# Patient Record
Sex: MALE | Race: WHITE | NOT HISPANIC OR LATINO | Employment: FULL TIME | URBAN - METROPOLITAN AREA
[De-identification: names, ages, dates, MRNs, and addresses within clinical notes are randomized per-mention and may not be internally consistent; named-entity substitution may affect disease eponyms.]

---

## 2018-01-29 ENCOUNTER — TELEMEDICINE2 (OUTPATIENT)
Dept: MEDICAL GROUP | Age: 38
End: 2018-01-29
Payer: COMMERCIAL

## 2018-01-29 ENCOUNTER — TELEMEDICINE ORIGINATING SITE VISIT (OUTPATIENT)
Dept: MEDICAL GROUP | Facility: CLINIC | Age: 38
End: 2018-01-29
Payer: COMMERCIAL

## 2018-01-29 VITALS
WEIGHT: 250.1 LBS | BODY MASS INDEX: 33.87 KG/M2 | HEART RATE: 94 BPM | TEMPERATURE: 99.2 F | HEIGHT: 72 IN | DIASTOLIC BLOOD PRESSURE: 95 MMHG | RESPIRATION RATE: 16 BRPM | SYSTOLIC BLOOD PRESSURE: 148 MMHG | OXYGEN SATURATION: 94 %

## 2018-01-29 DIAGNOSIS — M1A.09X0 CHRONIC GOUT OF MULTIPLE SITES, UNSPECIFIED CAUSE: ICD-10-CM

## 2018-01-29 DIAGNOSIS — E66.09 CLASS 1 OBESITY DUE TO EXCESS CALORIES WITHOUT SERIOUS COMORBIDITY WITH BODY MASS INDEX (BMI) OF 30.0 TO 30.9 IN ADULT: ICD-10-CM

## 2018-01-29 DIAGNOSIS — J40 BRONCHITIS: ICD-10-CM

## 2018-01-29 PROCEDURE — 99203 OFFICE O/P NEW LOW 30 MIN: CPT | Performed by: INTERNAL MEDICINE

## 2018-01-29 RX ORDER — GUAIFENESIN 200 MG/1
200 TABLET ORAL 3 TIMES DAILY PRN
Qty: 30 TAB | Refills: 1 | Status: SHIPPED | OUTPATIENT
Start: 2018-01-29

## 2018-01-29 RX ORDER — INDOMETHACIN 25 MG/1
25 CAPSULE ORAL 3 TIMES DAILY
COMMUNITY

## 2018-01-29 RX ORDER — AZITHROMYCIN 250 MG/1
TABLET, FILM COATED ORAL
Qty: 6 TAB | Refills: 0 | Status: SHIPPED | OUTPATIENT
Start: 2018-01-29

## 2018-01-29 RX ORDER — COLCHICINE 0.6 MG/1
0.6 TABLET ORAL DAILY
COMMUNITY

## 2018-01-29 ASSESSMENT — ENCOUNTER SYMPTOMS
NEUROLOGICAL NEGATIVE: 1
CHILLS: 0
COUGH: 1
MUSCULOSKELETAL NEGATIVE: 1
EYES NEGATIVE: 1
CARDIOVASCULAR NEGATIVE: 1
SPUTUM PRODUCTION: 1
SINUS PAIN: 0
GASTROINTESTINAL NEGATIVE: 1
WHEEZING: 0
FEVER: 1
SHORTNESS OF BREATH: 1
SORE THROAT: 1

## 2018-01-29 ASSESSMENT — PATIENT HEALTH QUESTIONNAIRE - PHQ9: CLINICAL INTERPRETATION OF PHQ2 SCORE: 0

## 2018-01-29 NOTE — PROGRESS NOTES
Subjective:      Arben Hicks is a 37 y.o. 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  Patient lives in [place]    1. Cough/bronchitis  Patient has a 3-4 day history of intermittent fevers, sore throat, productive cough, mild shortness of breath related to coughing. Minimal sinus drainage, fatigue. Patient has been treating with over-the-counter NyQuil and Mucinex with Advil. Sick contacts at work. Patient did not get his flu vaccine this year.    2. Gout  Stable with colchicine and Indocin when necessary    3. Obesity    Review of Systems   Constitutional: Positive for fever and malaise/fatigue. Negative for chills.   HENT: Positive for congestion and sore throat. Negative for sinus pain.    Eyes: Negative.    Respiratory: Positive for cough, sputum production and shortness of breath. Negative for wheezing.    Cardiovascular: Negative.    Gastrointestinal: Negative.    Genitourinary: Negative.    Musculoskeletal: Negative.    Skin: Negative.    Neurological: Negative.           Objective:     /95   Pulse 94   Temp 37.3 °C (99.2 °F)   Resp 16   Ht 1.829 m (6')   Wt 113.4 kg (250 lb 1.6 oz)   SpO2 94%   BMI 33.92 kg/m²      Physical Exam   Constitutional: He is oriented to person, place, and time. He appears well-developed and well-nourished. No distress.   HENT:   Head: Normocephalic and atraumatic.   Nose: Nose normal.   Mouth/Throat: No oropharyngeal exudate.   Oropharynx with erythema no exudate   Eyes: Conjunctivae and EOM are normal. Pupils are equal, round, and reactive to light. No scleral icterus.   Neck: Normal range of motion. Neck supple. No thyromegaly present.   No submandibular lymphadenopathy   Cardiovascular: Normal rate, regular rhythm, S1 normal, S2 normal, normal heart sounds and intact distal pulses.  Exam reveals no gallop.    Pulmonary/Chest: Effort normal. No  respiratory distress. He has no wheezes. He has no rhonchi.   Mild upper airway rales   Abdominal: Soft. Bowel sounds are normal. He exhibits no distension. There is no hepatosplenomegaly, splenomegaly or hepatomegaly. There is no tenderness. There is no rebound and no guarding.   Musculoskeletal: Normal range of motion. He exhibits no edema.   Lymphadenopathy:     He has no cervical adenopathy.   Neurological: He is alert and oriented to person, place, and time. He has normal strength and normal reflexes. Gait normal.   Skin: Skin is warm and dry.   Psychiatric: He has a normal mood and affect.   Nursing note and vitals reviewed.              Assessment/Plan:     1. Chronic gout of multiple sites, unspecified cause  Stable  Follow-up with PCP    2. Bronchitis  RTC if symptoms do not resolve  Rest, push fluids, vitamin C    - guaifenesin 200 MG tablet; Take 1 Tab by mouth 3 times a day as needed.  Dispense: 30 Tab; Refill: 1  - azithromycin (ZITHROMAX) 250 MG Tab; Take 2 tablets daily one, then one tablet daily  Dispense: 6 Tab; Refill: 0    3. Class 1 obesity due to excess calories without serious comorbidity with body mass index (BMI) of 30.0 to 30.9 in adult  Follow-up with PCP

## 2020-07-05 ENCOUNTER — HOSPITAL ENCOUNTER (EMERGENCY)
Facility: HOSPITAL | Age: 40
Discharge: HOME | End: 2020-07-05
Attending: EMERGENCY MEDICINE
Payer: COMMERCIAL

## 2020-07-05 ENCOUNTER — APPOINTMENT (OUTPATIENT)
Dept: CT IMAGING | Facility: HOSPITAL | Age: 40
End: 2020-07-05
Payer: COMMERCIAL

## 2020-07-05 VITALS
HEART RATE: 100 BPM | HEIGHT: 72 IN | BODY MASS INDEX: 35.24 KG/M2 | TEMPERATURE: 100 F | OXYGEN SATURATION: 91 % | DIASTOLIC BLOOD PRESSURE: 76 MMHG | RESPIRATION RATE: 16 BRPM | WEIGHT: 260.14 LBS | SYSTOLIC BLOOD PRESSURE: 140 MMHG

## 2020-07-05 DIAGNOSIS — R11.0 NAUSEA: ICD-10-CM

## 2020-07-05 DIAGNOSIS — R19.7 DIARRHEA: ICD-10-CM

## 2020-07-05 DIAGNOSIS — K57.92 DIVERTICULITIS: ICD-10-CM

## 2020-07-05 DIAGNOSIS — R10.30 LOWER ABDOMINAL PAIN: Primary | ICD-10-CM

## 2020-07-05 LAB
ALBUMIN SERPL-MCNC: 4.3 G/DL (ref 3.5–5.3)
ALP SERPL-CCNC: 43 U/L (ref 45–115)
ALT SERPL-CCNC: 43 U/L (ref 7–52)
ANION GAP SERPL CALC-SCNC: 9 MMOL/L (ref 3–11)
AST SERPL-CCNC: 22 U/L
BACTERIA #/AREA URNS AUTO: NORMAL /HPF
BASOPHILS # BLD AUTO: 0.1 10*3/UL
BASOPHILS NFR BLD AUTO: 1 % (ref 0–2)
BILIRUB SERPL-MCNC: 1.42 MG/DL (ref 0.2–1.4)
BILIRUB UR QL STRIP.AUTO: NEGATIVE
BUN SERPL-MCNC: 14 MG/DL (ref 7–25)
CALCIUM ALBUM COR SERPL-MCNC: 9.2 MG/DL (ref 8.6–10.3)
CALCIUM SERPL-MCNC: 9.4 MG/DL (ref 8.6–10.3)
CHLORIDE SERPL-SCNC: 103 MMOL/L (ref 98–107)
CLARITY UR: CLEAR
CO2 SERPL-SCNC: 25 MMOL/L (ref 21–32)
COLOR UR: YELLOW
CREAT SERPL-MCNC: 1.05 MG/DL (ref 0.7–1.3)
EOSINOPHIL # BLD AUTO: 0.1 10*3/UL
EOSINOPHIL NFR BLD AUTO: 1 % (ref 0–3)
ERYTHROCYTE [DISTWIDTH] IN BLOOD BY AUTOMATED COUNT: 12.7 % (ref 11.5–15)
GFR SERPL CREATININE-BSD FRML MDRD: 89 ML/MIN/1.73M*2
GLUCOSE SERPL-MCNC: 94 MG/DL (ref 70–105)
GLUCOSE UR STRIP.AUTO-MCNC: NEGATIVE MG/DL
HCT VFR BLD AUTO: 42.9 % (ref 38–50)
HGB BLD-MCNC: 14.9 G/DL (ref 13.2–17.2)
HGB UR QL STRIP.AUTO: NEGATIVE
KETONES UR STRIP.AUTO-MCNC: NEGATIVE MG/DL
LEUKOCYTE ESTERASE UR QL STRIP: NEGATIVE
LIPASE SERPL-CCNC: 14 U/L (ref 11–82)
LYMPHOCYTES # BLD AUTO: 1 10*3/UL
LYMPHOCYTES NFR BLD AUTO: 8 % (ref 15–47)
MCH RBC QN AUTO: 29.2 PG (ref 29–34)
MCHC RBC AUTO-ENTMCNC: 34.6 G/DL (ref 32–36)
MCV RBC AUTO: 84.4 FL (ref 82–97)
MONOCYTES # BLD AUTO: 1.2 10*3/UL
MONOCYTES NFR BLD AUTO: 10 % (ref 5–13)
NEUTROPHILS # BLD AUTO: 9.7 10*3/UL
NEUTROPHILS NFR BLD AUTO: 81 % (ref 46–70)
NITRITE UR QL STRIP.AUTO: NEGATIVE
PH UR STRIP.AUTO: 7 PH
PLATELET # BLD AUTO: 227 10*3/UL (ref 130–350)
PMV BLD AUTO: 7.3 FL (ref 6.9–10.8)
POTASSIUM SERPL-SCNC: 3.8 MMOL/L (ref 3.5–5.1)
PROT SERPL-MCNC: 7.4 G/DL (ref 6–8.3)
PROT UR STRIP.AUTO-MCNC: NEGATIVE MG/DL
RBC # BLD AUTO: 5.09 10*6/ΜL (ref 4.1–5.8)
RBC #/AREA URNS AUTO: NORMAL /HPF
SODIUM SERPL-SCNC: 137 MMOL/L (ref 135–145)
SP GR UR STRIP.AUTO: 1.02 (ref 1–1.03)
SQUAMOUS #/AREA URNS AUTO: NEGATIVE /HPF
UROBILINOGEN UR STRIP.AUTO-MCNC: <2 E.U./DL
WBC # BLD AUTO: 12 10*3/UL (ref 3.7–9.6)
WBC #/AREA URNS AUTO: NEGATIVE /HPF

## 2020-07-05 PROCEDURE — 85025 COMPLETE CBC W/AUTO DIFF WBC: CPT | Performed by: NURSE PRACTITIONER

## 2020-07-05 PROCEDURE — 96374 THER/PROPH/DIAG INJ IV PUSH: CPT

## 2020-07-05 PROCEDURE — 2580000300 HC RX 258: Performed by: NURSE PRACTITIONER

## 2020-07-05 PROCEDURE — 36415 COLL VENOUS BLD VENIPUNCTURE: CPT | Performed by: NURSE PRACTITIONER

## 2020-07-05 PROCEDURE — 96361 HYDRATE IV INFUSION ADD-ON: CPT

## 2020-07-05 PROCEDURE — 80053 COMPREHEN METABOLIC PANEL: CPT | Performed by: NURSE PRACTITIONER

## 2020-07-05 PROCEDURE — 2550000100 HC RX 255: Performed by: NURSE PRACTITIONER

## 2020-07-05 PROCEDURE — 6360000200 HC RX 636 W HCPCS (ALT 250 FOR IP): Performed by: NURSE PRACTITIONER

## 2020-07-05 PROCEDURE — 83690 ASSAY OF LIPASE: CPT | Performed by: NURSE PRACTITIONER

## 2020-07-05 PROCEDURE — 96376 TX/PRO/DX INJ SAME DRUG ADON: CPT

## 2020-07-05 PROCEDURE — 36415 COLL VENOUS BLD VENIPUNCTURE: CPT | Performed by: EMERGENCY MEDICINE

## 2020-07-05 PROCEDURE — 74177 CT ABD & PELVIS W/CONTRAST: CPT | Mod: MG

## 2020-07-05 PROCEDURE — 81001 URINALYSIS AUTO W/SCOPE: CPT | Performed by: NURSE PRACTITIONER

## 2020-07-05 PROCEDURE — 96375 TX/PRO/DX INJ NEW DRUG ADDON: CPT

## 2020-07-05 PROCEDURE — 99284 EMERGENCY DEPT VISIT MOD MDM: CPT | Performed by: EMERGENCY MEDICINE

## 2020-07-05 RX ORDER — SODIUM CHLORIDE 9 MG/ML
1000 INJECTION, SOLUTION INTRAVENOUS ONCE
Status: COMPLETED | OUTPATIENT
Start: 2020-07-05 | End: 2020-07-05

## 2020-07-05 RX ORDER — ONDANSETRON HYDROCHLORIDE 2 MG/ML
4 INJECTION, SOLUTION INTRAVENOUS ONCE
Status: COMPLETED | OUTPATIENT
Start: 2020-07-05 | End: 2020-07-05

## 2020-07-05 RX ORDER — FENTANYL CITRATE/PF 50 MCG/ML
50 PLASTIC BAG, INJECTION (ML) INTRAVENOUS
Status: DISCONTINUED | OUTPATIENT
Start: 2020-07-05 | End: 2020-07-05 | Stop reason: HOSPADM

## 2020-07-05 RX ORDER — CIPROFLOXACIN HCL 500 MG
1 TABLET ORAL ONCE
Status: COMPLETED | OUTPATIENT
Start: 2020-07-05 | End: 2020-07-05

## 2020-07-05 RX ORDER — IOPAMIDOL 755 MG/ML
140 INJECTION, SOLUTION INTRAVASCULAR ONCE
Status: COMPLETED | OUTPATIENT
Start: 2020-07-05 | End: 2020-07-05

## 2020-07-05 RX ORDER — METRONIDAZOLE 500 MG/1
1 TABLET ORAL ONCE
Status: COMPLETED | OUTPATIENT
Start: 2020-07-05 | End: 2020-07-05

## 2020-07-05 RX ADMIN — IOPAMIDOL 140 ML: 755 INJECTION, SOLUTION INTRAVENOUS at 19:25

## 2020-07-05 RX ADMIN — ONDANSETRON 4 MG: 2 INJECTION INTRAMUSCULAR; INTRAVENOUS at 18:45

## 2020-07-05 RX ADMIN — SODIUM CHLORIDE 1000 ML: 9 INJECTION, SOLUTION INTRAVENOUS at 18:44

## 2020-07-05 RX ADMIN — METRONIDAZOLE 1 PACKAGE: 500 TABLET ORAL at 20:49

## 2020-07-05 RX ADMIN — FENTANYL CITRATE 50 MCG: 50 INJECTION, SOLUTION INTRAMUSCULAR; INTRAVENOUS at 19:56

## 2020-07-05 RX ADMIN — FENTANYL CITRATE 50 MCG: 50 INJECTION, SOLUTION INTRAMUSCULAR; INTRAVENOUS at 18:44

## 2020-07-05 RX ADMIN — Medication 1 PACKAGE: at 20:30

## 2020-07-05 ASSESSMENT — ENCOUNTER SYMPTOMS
EYE PAIN: 0
DIFFICULTY URINATING: 0
ACTIVITY CHANGE: 1
BLOOD IN STOOL: 1
ARTHRALGIAS: 0
FATIGUE: 1
NAUSEA: 1
FLANK PAIN: 0
CHILLS: 1
LIGHT-HEADEDNESS: 0
VOMITING: 0
PALPITATIONS: 0
CHEST TIGHTNESS: 0
SINUS PAIN: 0
FEVER: 1
ABDOMINAL PAIN: 1
DYSURIA: 0
HEADACHES: 0
SHORTNESS OF BREATH: 0
COUGH: 0
APPETITE CHANGE: 1
DIARRHEA: 1
EYE DISCHARGE: 0
SINUS PRESSURE: 0
DIZZINESS: 0
RECTAL PAIN: 0

## 2020-07-06 NOTE — DISCHARGE INSTRUCTIONS
You have been evaluated through the emergency department with lower abdominal pain.  CAT scan does show concerning for diverticulitis.  Recommend home to rest.  Take antibiotics as directed.  [place] diet.  Avoid seeds (nuts, tomatoes, strawberries).  Follow-up with gastrointestinal specialty in the next 2 to 4 weeks.  May need to consider colonoscopy completion.  Follow-up with your primary care provider as needed.  Return to the emergency department if worse

## 2020-07-06 NOTE — ED ATTESTATION NOTE
I performed a history and physical examination of Daniel Max and discussed his management with Advanced Practice Provider, Gabriela Chacko CNP.  I agree with the history, physical, assessment, and plan of care.  On my face-to-face visit with patient, patient has lower abdominal tenderness and left lower quadrant tenderness most specifically.  He has guarding.  He is feeling better after he was given pain medication.  Evaluation returned showing leukocytosis and CT is consistent with diverticulitis.  He is given prescription for Cipro and Flagyl and will be discharged to follow-up with primary care.         Hawk Dominguez MD  07/05/20 2035

## 2020-07-06 NOTE — ED PROVIDER NOTES
"HPI:  Chief Complaint   Patient presents with   • Abdominal Pain     pt reports lower abd pain intermittenly since thursday. pt reported loose stools thrusday through saturday. nothing today. urine collected in triage        HPI  Patient presents to ED with complaints of lower abdominal pain progressively worsening since Thursday.  Patient states normal bowel movements until yesterday stating large loose mucus-like bowel movement last night with possible blood tinge to stool.  No blood in toilet.  Continues to complain of nausea.  No emesis.  Low grade fever up to 101 degrees since yesterday.  Denies chest pain or shortness of breath.  Denies burning frequency or urgency with urination.  Did feel as if his testicles were \"loose\" yesterday.  Nothing today with testicular pain or discomfort.  Last oral intake at 7 am this morning.  Had sip of water around lunch and nothing since.      HISTORY:  History reviewed. No pertinent past medical history.    Past Surgical History:   Procedure Laterality Date   • FRACTURE SURGERY      jaw       History reviewed. No pertinent family history.    Social History     Tobacco Use   • Smoking status: Never Smoker   • Smokeless tobacco: Never Used   Substance Use Topics   • Alcohol use: Yes     Comment: socially   • Drug use: Never       ROS:  Review of Systems   Constitutional: Positive for activity change, appetite change, chills, fatigue and fever.   HENT: Negative for congestion, ear discharge, ear pain, sinus pressure and sinus pain.    Eyes: Negative for pain and discharge.   Respiratory: Negative for cough, chest tightness and shortness of breath.    Cardiovascular: Negative for chest pain, palpitations and leg swelling.   Gastrointestinal: Positive for abdominal pain, blood in stool, diarrhea and nausea. Negative for rectal pain and vomiting.   Genitourinary: Negative for difficulty urinating, discharge, dysuria, flank pain, penile pain, penile swelling, scrotal swelling and " testicular pain.   Musculoskeletal: Negative for arthralgias.   Skin: Negative.    Neurological: Negative for dizziness, light-headedness and headaches.   Psychiatric/Behavioral: Negative for behavioral problems.       PE:  ED Triage Vitals   Temp Heart Rate Resp BP SpO2   07/05/20 1802 07/05/20 1802 07/05/20 1802 07/05/20 1802 07/05/20 1802   37.8 °C (100 °F) 108 16 (!) 158/102 95 %      Temp Source Heart Rate Source Patient Position BP Location FiO2 (%)   07/05/20 1802 -- 07/05/20 1900 -- --   Oral  Head of bed 30 degrees or higher         Physical Exam  Vitals signs and nursing note reviewed.   Constitutional:       Appearance: He is well-developed.   HENT:      Head: Normocephalic.   Eyes:      Conjunctiva/sclera: Conjunctivae normal.      Pupils: Pupils are equal, round, and reactive to light.   Neck:      Musculoskeletal: Normal range of motion.   Cardiovascular:      Rate and Rhythm: Normal rate and regular rhythm.      Heart sounds: Normal heart sounds.   Pulmonary:      Effort: Pulmonary effort is normal.      Breath sounds: Normal breath sounds.   Abdominal:      General: Bowel sounds are normal.      Palpations: Abdomen is soft.      Tenderness: There is abdominal tenderness in the right lower quadrant, suprapubic area and left lower quadrant. There is guarding. There is no right CVA tenderness or left CVA tenderness.   Musculoskeletal: Normal range of motion.   Skin:     General: Skin is warm and dry.      Capillary Refill: Capillary refill takes less than 2 seconds.   Neurological:      General: No focal deficit present.      Mental Status: He is alert and oriented to person, place, and time.         ED LABS:  Labs Reviewed   CBC WITH AUTO DIFFERENTIAL - Abnormal       Result Value    WBC 12.0 (*)     RBC 5.09      Hemoglobin 14.9      Hematocrit 42.9      MCV 84.4      MCH 29.2      MCHC 34.6      RDW 12.7      Platelets 227      MPV 7.3      Neutrophils% 81 (*)     Lymphocytes% 8 (*)     Monocytes% 10       Eosinophils% 1      Basophils% 1      Neutrophils Absolute 9.70      Lymphocytes Absolute 1.00      Monocytes Absolute 1.20      Eosinophils Absolute 0.10      Basophils Absolute 0.10     COMPREHENSIVE METABOLIC PANEL - Abnormal    Sodium 137      Potassium 3.8      Chloride 103      CO2 25      Anion Gap 9      BUN 14      Creatinine 1.05      Glucose 94      Calcium 9.4      AST 22      ALT (SGPT) 43      Alkaline Phosphatase 43 (*)     Total Protein 7.4      Albumin 4.3      Total Bilirubin 1.42 (*)     eGFR 89      Corrected Calcium 9.2      Narrative:     Estimated GFR calculated using the 2009 CKD-EPI creatinine equation.   LIPASE - Normal    Lipase 14     URINALYSIS WITH MICROSCOPIC   URINE EXTRA CONTAINER       ED IMAGES:  CT ABDOMEN PELVIS W IV CONTRAST No Oral Contrast   Final Result   Impression:      1. Probable sigmoid diverticulitis. No abscess or free air. Follow-up to complete resolution and correlation with endoscopy to exclude less likely microperforated colon cancer.                No results found.    ED COURSE:       MDM:  MDM  39 year old male patient with acute onset lower abdominal pain since Thursday.  Low grade fever.  Bout of diarrhea yesterday.  Nausea; no emesis.  History of frequent constipation/diarrhea bouts; no diagnosis of colitis.      CT abdomen, CBC, CMP, UA.  IV NS bolus.  Zofran for nausea, fentanyl for pain.  Patient does have allergy to shellfish; however, reaction is nausea.      CT with probable sigmoid diverticulitis.  Patient has been taking oral intake with only slight nausea.  No emesis.  Feel patient is a candidate for outpatient therapy at this time.  Will be started on Flagyl and Cipro.  These are dispensed through the emergency department tonight.  Recommend bland diet.  Avoid seeds.  Patient voices understanding.  Additionally, patient is encouraged to follow-up with GI to consider colonoscopy completion.  Patient's condition is stable at time of  discharge.  Final diagnoses:   [R10.30] Lower abdominal pain   [R11.0] Nausea   [R19.7] Diarrhea   [K57.92] Diverticulitis       A voice recognition program was used to aid in the documentation of this record. Sometimes words are not printed exactly as they were spoken. While efforts were made to carefully edit and correct any inaccuracies, some areas may be present; please take these into context. Please contact the provider if areas are identified.      Gabriela Avery, IVAN  07/05/20 2026